# Patient Record
Sex: FEMALE | Race: WHITE | URBAN - METROPOLITAN AREA
[De-identification: names, ages, dates, MRNs, and addresses within clinical notes are randomized per-mention and may not be internally consistent; named-entity substitution may affect disease eponyms.]

---

## 2019-02-26 ENCOUNTER — OFFICE VISIT (OUTPATIENT)
Dept: OBGYN CLINIC | Facility: CLINIC | Age: 39
End: 2019-02-26

## 2019-02-26 ENCOUNTER — TELEPHONE (OUTPATIENT)
Dept: OBGYN CLINIC | Facility: CLINIC | Age: 39
End: 2019-02-26

## 2019-02-26 VITALS — SYSTOLIC BLOOD PRESSURE: 130 MMHG | DIASTOLIC BLOOD PRESSURE: 78 MMHG | WEIGHT: 143 LBS

## 2019-02-26 DIAGNOSIS — A63.0 CONDYLOMA ACUMINATUM OF VULVA: ICD-10-CM

## 2019-02-26 DIAGNOSIS — Z01.419 ENCOUNTER FOR WELL WOMAN EXAM WITH ROUTINE GYNECOLOGICAL EXAM: Primary | ICD-10-CM

## 2019-02-26 PROCEDURE — 99395 PREV VISIT EST AGE 18-39: CPT | Performed by: OBSTETRICS & GYNECOLOGY

## 2019-02-26 NOTE — PROGRESS NOTES
HPI:    Patient ID: Chio Laughlin is a 45year old female. 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 Will schedule excision under local.  Broke up from 4 yr relationship with male partner.   Menses shape, and position. No masses, tenderness, or nodules. Cardiovascular: Normal rate and regular rhythm. Pulmonary/Chest: Effort normal.   Abdominal: Soft. Normal appearance and bowel sounds are normal. She exhibits no mass.  There is no hepatosplenomeg self breast exam.  First mammogram at age 36 unless family history or other. Contraception discussed. Recommend regular exercise and quality diet. Return to clinic in one year or as needed.   Orders Placed This Encounter      Hpv Dna  High Risk , Thin Pr

## 2019-02-26 NOTE — TELEPHONE ENCOUNTER
Pt is self pay and when contacting billing dept, they told pt she needs procedure codes from RN in order to be billed correctly. Stated she needs codes for gardasil and local excision. Able to leave detailed vm with codes If no answer.

## 2019-02-27 LAB — HPV I/H RISK 1 DNA SPEC QL NAA+PROBE: NEGATIVE

## 2019-02-28 NOTE — TELEPHONE ENCOUNTER
Pt states she needs codes for gardasil vaccine and anesthesia   for wart removal     Okay to lv detailed vm  Pt Is at work and cannot answer

## 2019-02-28 NOTE — TELEPHONE ENCOUNTER
Pt called and codes given to pt. Advised pt to call with any other questions or concerns.         Wander Tuttle 2 hours ago (9:04 AM)      Severiano Candle, depending on the size of the condyloma that was excised:   [address]408 (excision, benign lesion including margins on

## 2019-03-05 ENCOUNTER — OFFICE VISIT (OUTPATIENT)
Dept: OBGYN CLINIC | Facility: CLINIC | Age: 39
End: 2019-03-05

## 2019-03-05 VITALS — DIASTOLIC BLOOD PRESSURE: 72 MMHG | HEART RATE: 94 BPM | WEIGHT: 145 LBS | SYSTOLIC BLOOD PRESSURE: 105 MMHG

## 2019-03-05 DIAGNOSIS — A63.0 CONDYLOMA ACUMINATUM OF VULVA: ICD-10-CM

## 2019-03-05 DIAGNOSIS — N90.89 VULVAR LESION: Primary | ICD-10-CM

## 2019-03-05 PROCEDURE — 11421 EXC H-F-NK-SP B9+MARG 0.6-1: CPT | Performed by: OBSTETRICS & GYNECOLOGY

## 2019-03-07 ENCOUNTER — TELEPHONE (OUTPATIENT)
Dept: OBGYN CLINIC | Facility: CLINIC | Age: 39
End: 2019-03-07

## 2019-03-07 PROBLEM — N90.89 VULVAR LESION: Status: ACTIVE | Noted: 2019-03-07

## 2019-03-07 NOTE — TELEPHONE ENCOUNTER
lmtcb    ----- Message from [facility] MD SHEREEN sent at 3/7/2019  2:25 PM CST -----  Please inform by MyChart, mail, or call of normal laboratory tests-- path report from excision confirmed large flat condyloma. No cancerous or precancerous cells.

## 2019-03-07 NOTE — PROCEDURES
Consent obtained  In [facility] the vulva was prepped with Betadine and the skin beneath the right vulvar lesion was infiltrated with 1% Xylocaine with epinephrine.   Using a #15 blade scalpel the lesion was excised in elliptical manner in its entirety with a

## 2019-03-07 NOTE — TELEPHONE ENCOUNTER
Patient can be informed via [address], phone, or mail that her pathology report from the lesion that was removed from the vulva was benign. It showed what we knew it to be which is a large flat condyloma acuminatum.

## 2019-03-08 NOTE — TELEPHONE ENCOUNTER
Informed pt of message below-      Elkin Engle MD          3/7/19 5:10 PM   Note      Patient can be informed via [address], phone, or mail that her pathology report from the lesion that was removed from the vulva was benign.   It showed what we knew it

## 2019-03-16 ENCOUNTER — OFFICE VISIT (OUTPATIENT)
Dept: OBGYN CLINIC | Facility: CLINIC | Age: 39
End: 2019-03-16

## 2019-03-16 VITALS — SYSTOLIC BLOOD PRESSURE: 118 MMHG | WEIGHT: 140 LBS | DIASTOLIC BLOOD PRESSURE: 72 MMHG

## 2019-03-16 DIAGNOSIS — A63.0 CONDYLOMA ACUMINATUM OF VULVA: ICD-10-CM

## 2019-03-16 DIAGNOSIS — N90.89 VULVAR LESION: Primary | ICD-10-CM

## 2019-03-16 DIAGNOSIS — Z23 NEED FOR VACCINATION: ICD-10-CM

## 2019-03-16 LAB
CONTROL LINE PRESENT WITH A CLEAR BACKGROUND (YES/NO): YES YES/NO
KIT LOT #: NORMAL NUMERIC

## 2019-03-16 PROCEDURE — 90471 IMMUNIZATION ADMIN: CPT | Performed by: OBSTETRICS & GYNECOLOGY

## 2019-03-16 PROCEDURE — 90651 9VHPV VACCINE 2/3 DOSE IM: CPT | Performed by: OBSTETRICS & GYNECOLOGY

## 2019-03-16 PROCEDURE — 99212 OFFICE O/P EST SF 10 MIN: CPT | Performed by: OBSTETRICS & GYNECOLOGY

## 2019-03-16 PROCEDURE — 81025 URINE PREGNANCY TEST: CPT | Performed by: OBSTETRICS & GYNECOLOGY

## 2019-03-16 NOTE — PROGRESS NOTES
HPI:    Patient ID: Raiza Patel is a 45year old female. HPI  Follow up. Discussed path results condyloma/VAIN 1  Counseled on Gardasil and wishes to start today. Aim to reduce risk of HPV lesions and infections.   Reduce risk of cervical cancer and p

## 2019-05-15 ENCOUNTER — TELEPHONE (OUTPATIENT)
Dept: OBGYN CLINIC | Facility: CLINIC | Age: 39
End: 2019-05-15

## 2019-05-15 ENCOUNTER — NURSE ONLY (OUTPATIENT)
Dept: OBGYN CLINIC | Facility: CLINIC | Age: 39
End: 2019-05-15

## 2019-05-15 VITALS — DIASTOLIC BLOOD PRESSURE: 64 MMHG | SYSTOLIC BLOOD PRESSURE: 108 MMHG

## 2019-05-15 DIAGNOSIS — Z23 ENCOUNTER FOR ADMINISTRATION OF VACCINE: Primary | ICD-10-CM

## 2019-05-15 DIAGNOSIS — Z23 NEED FOR VACCINATION: ICD-10-CM

## 2019-05-15 PROCEDURE — 81025 URINE PREGNANCY TEST: CPT | Performed by: OBSTETRICS & GYNECOLOGY

## 2019-05-15 PROCEDURE — 90651 9VHPV VACCINE 2/3 DOSE IM: CPT | Performed by: OBSTETRICS & GYNECOLOGY

## 2019-05-15 PROCEDURE — 90471 IMMUNIZATION ADMIN: CPT | Performed by: OBSTETRICS & GYNECOLOGY

## 2019-05-15 NOTE — TELEPHONE ENCOUNTER
Pt wants to know if she can come in sooner for gardisil apt   Will be available after 1030 am    Pt has to go to work

## 2019-09-16 ENCOUNTER — NURSE ONLY (OUTPATIENT)
Dept: OBGYN CLINIC | Facility: CLINIC | Age: 39
End: 2019-09-16

## 2019-09-16 VITALS — SYSTOLIC BLOOD PRESSURE: 129 MMHG | DIASTOLIC BLOOD PRESSURE: 84 MMHG

## 2019-09-16 DIAGNOSIS — Z23 NEED FOR VACCINATION: Primary | ICD-10-CM

## 2019-09-16 LAB
CONTROL LINE PRESENT WITH A CLEAR BACKGROUND (YES/NO): YES YES/NO
KIT LOT #: NORMAL NUMERIC

## 2019-09-16 PROCEDURE — 90651 9VHPV VACCINE 2/3 DOSE IM: CPT | Performed by: OBSTETRICS & GYNECOLOGY

## 2019-09-16 PROCEDURE — 90471 IMMUNIZATION ADMIN: CPT | Performed by: OBSTETRICS & GYNECOLOGY

## 2019-09-16 PROCEDURE — 81025 URINE PREGNANCY TEST: CPT | Performed by: OBSTETRICS & GYNECOLOGY

## 2019-09-16 NOTE — PROGRESS NOTES
Gardasil 0.5 ml IM administered to the pt in the left deltoid. Pt tolerated well. No adverse effects. This will be the pt's third and final Gardasil vaccine.

## 2022-02-27 ENCOUNTER — HOSPITAL ENCOUNTER (EMERGENCY)
Facility: HOSPITAL | Age: 42
Discharge: HOME | End: 2022-02-28

## 2022-02-27 DIAGNOSIS — S71.111A LACERATION OF RIGHT THIGH, INITIAL ENCOUNTER: Primary | ICD-10-CM

## 2022-02-27 PROCEDURE — 90471 IMMUNIZATION ADMIN: CPT

## 2022-02-27 PROCEDURE — 99283 EMERGENCY DEPT VISIT LOW MDM: CPT

## 2022-02-27 PROCEDURE — 12002 RPR S/N/AX/GEN/TRNK2.6-7.5CM: CPT

## 2022-02-28 VITALS
DIASTOLIC BLOOD PRESSURE: 75 MMHG | WEIGHT: 130 LBS | HEART RATE: 74 BPM | BODY MASS INDEX: 24.55 KG/M2 | SYSTOLIC BLOOD PRESSURE: 106 MMHG | OXYGEN SATURATION: 99 % | HEIGHT: 61 IN | TEMPERATURE: 99 F | RESPIRATION RATE: 18 BRPM

## 2022-02-28 NOTE — ED INITIAL ASSESSMENT (HPI)
C/o right upper thigh laceration. Patient was at work jumping fence when her foot got caught and she cut her leg. Bleeding controlled. Bandage placed. Patient denies other injury. Denies head, neck, or back pain.

## (undated) NOTE — LETTER
Date & Time: 2/28/2022, 12:54 AM  Patient: Radha Akbar  Encounter Provider(s):    ROBERTA Tamez       To Whom It May Concern:    Radha Akbar was seen and treated in our department on 2/27/2022. She should not return to work until 3/3/22.     If you have any questions or concerns, please do not hesitate to call.        _____________________________  Physician/APC Signature

## (undated) NOTE — LETTER
3/4/2019              [address] [facility]         Dear Kalpesh Hinson,    It was a pleasure to see you. Your pap and hpv cotesting was normal.  There is no need for further testing at this time.   I look forward to see

## (undated) NOTE — LETTER
AUTHORIZATION FOR SURGICAL OPERATION OR OTHER PROCEDURE    1.  [facility] assigned to my case to perform the following operation and/or procedure at the [facility]: Excision of Vulvar Condyloma (please print)      Patient signature:  ___________________________________________________             Relationship to Patient:           []  Parent    Responsible person                          []  Spouse  In case of minor or                    [] Other